# Patient Record
Sex: MALE | Race: WHITE | Employment: FULL TIME | URBAN - METROPOLITAN AREA
[De-identification: names, ages, dates, MRNs, and addresses within clinical notes are randomized per-mention and may not be internally consistent; named-entity substitution may affect disease eponyms.]

---

## 2020-08-07 ENCOUNTER — HOSPITAL ENCOUNTER (EMERGENCY)
Age: 60
Discharge: HOME | End: 2020-08-07
Payer: COMMERCIAL

## 2020-08-07 ENCOUNTER — APPOINTMENT (OUTPATIENT)
Dept: GENERAL RADIOLOGY | Age: 60
End: 2020-08-07
Payer: COMMERCIAL

## 2020-08-07 VITALS
WEIGHT: 222 LBS | HEIGHT: 71 IN | SYSTOLIC BLOOD PRESSURE: 171 MMHG | TEMPERATURE: 97.6 F | HEART RATE: 62 BPM | RESPIRATION RATE: 17 BRPM | DIASTOLIC BLOOD PRESSURE: 93 MMHG | BODY MASS INDEX: 31.08 KG/M2 | OXYGEN SATURATION: 98 %

## 2020-08-07 PROCEDURE — 90715 TDAP VACCINE 7 YRS/> IM: CPT | Performed by: PHYSICIAN ASSISTANT

## 2020-08-07 PROCEDURE — 73130 X-RAY EXAM OF HAND: CPT

## 2020-08-07 PROCEDURE — 6360000002 HC RX W HCPCS: Performed by: PHYSICIAN ASSISTANT

## 2020-08-07 PROCEDURE — 99282 EMERGENCY DEPT VISIT SF MDM: CPT

## 2020-08-07 PROCEDURE — 12002 RPR S/N/AX/GEN/TRNK2.6-7.5CM: CPT

## 2020-08-07 PROCEDURE — 90471 IMMUNIZATION ADMIN: CPT | Performed by: PHYSICIAN ASSISTANT

## 2020-08-07 RX ORDER — HYDROCODONE BITARTRATE AND ACETAMINOPHEN 5; 325 MG/1; MG/1
1 TABLET ORAL EVERY 8 HOURS PRN
Qty: 9 TABLET | Refills: 0 | Status: SHIPPED | OUTPATIENT
Start: 2020-08-07 | End: 2020-08-10

## 2020-08-07 RX ORDER — ASPIRIN 81 MG/1
81 TABLET, CHEWABLE ORAL DAILY
COMMUNITY

## 2020-08-07 RX ORDER — BACITRACIN, NEOMYCIN, POLYMYXIN B 400; 3.5; 5 [USP'U]/G; MG/G; [USP'U]/G
OINTMENT TOPICAL
Status: DISCONTINUED
Start: 2020-08-07 | End: 2020-08-07 | Stop reason: HOSPADM

## 2020-08-07 RX ADMIN — TETANUS TOXOID, REDUCED DIPHTHERIA TOXOID AND ACELLULAR PERTUSSIS VACCINE, ADSORBED 0.5 ML: 5; 2.5; 8; 8; 2.5 SUSPENSION INTRAMUSCULAR at 12:10

## 2020-08-07 SDOH — HEALTH STABILITY: MENTAL HEALTH: HOW OFTEN DO YOU HAVE A DRINK CONTAINING ALCOHOL?: NEVER

## 2020-08-07 ASSESSMENT — PAIN DESCRIPTION - PAIN TYPE: TYPE: ACUTE PAIN

## 2020-08-07 ASSESSMENT — ENCOUNTER SYMPTOMS
CONSTIPATION: 0
SHORTNESS OF BREATH: 0
ABDOMINAL PAIN: 0
VOMITING: 0
COLOR CHANGE: 0
NAUSEA: 0
DIARRHEA: 0

## 2020-08-07 ASSESSMENT — PAIN SCALES - GENERAL: PAINLEVEL_OUTOF10: 6

## 2020-08-07 NOTE — ED NOTES
Bed: 26  Expected date:   Expected time:   Means of arrival:   Comments:  malik Rajput, LUNA  08/07/20 6017

## 2020-08-07 NOTE — ED PROVIDER NOTES
**EVALUATED BY MARISAS**    [address] [facility] dEPART[facility] eNCOUnter    Pt Name: My Bullock  MRN: [id number]  [place] 1960  Date of evaluation: 8/7/2020  Provider: SHERI Nash    Chief Complaint:    Chief Complaint   Patient presents with    Laceration     Pt to ER with c/o laceration to back left hand after getting hit by fan in [facility], states covered whole back of hand. still actively bleeding at ER arrival. wrapped by Er staff. takes baby ASA daily. denies hand getting caught       Nursing Notes, Past Medical Hx, Past Surgical Hx, Social Hx, Allergies, and Family Hx were all reviewed and agreedwith or any disagreements were addressed in the HPI.    HPI:  (Location, Duration, Timing, Severity, Quality, Assoc Sx, Context, Modifying factors)  This is a  60 y.o. male who presents to the emergency department with complaints of sticking his hand in a running AC fan. Patient has a large laceration extending the entire dorsum of the hand. Unknown last tetanus shot. Right-hand-dominant. Denies any decreased range of motion of the left hand or wrist.  Denies numbness tingling or weakness. Patient does take a baby aspirin daily. Patient's laceration is not a straight laceration and there is some jagged edges. He rates his overall pain 6 out of 10 in severity described as throbbing. Patient does live a few hours away from here but does come home once a week to help his father out with some property management. All other systems were reviewed and are negative.      Past Medical/Surgical History:      Diagnosis Date    Hypertension          Procedure Laterality Date    PROSTATE SURGERY      jan 2020       Medications:  Previous Medications    ASPIRIN 81 MG CHEWABLE TABLET    Take 81 mg by mouth daily    MONTELUKAST SODIUM (SINGULAIR PO)    Take by mouth    NONFORMULARY    2 Bp meds, unsure of names    TAMSULOSIN HCL (FLOMAX PO)    Take by mouth         Review of Systems:  Review of Systems   Constitutional: Negative for chills, fatigue and fever. Respiratory: Negative for shortness of breath. Cardiovascular: Negative for chest pain. Gastrointestinal: Negative for abdominal pain, constipation, diarrhea, nausea and vomiting. Musculoskeletal: Positive for arthralgias (left hand). Negative for neck stiffness. Skin: Positive for wound. Negative for color change. Neurological: Negative for weakness and numbness. All other systems reviewed and are negative. Positives and Pertinent negatives as per HPI. Except as noted above in the ROS, all other systems were reviewed/completed and are negative. Physical Exam:  Physical Exam  Vitals signs and nursing note reviewed. Constitutional:       Appearance: Normal appearance. He is well-developed. He is not toxic-appearing or diaphoretic. HENT:      Head: Normocephalic. Right Ear: External ear normal.      Left Ear: External ear normal.      Nose: Nose normal.   Eyes:      General:         Right eye: No discharge. Left eye: No discharge. Conjunctiva/sclera: Conjunctivae normal.   Neck:      Musculoskeletal: Normal range of motion and neck supple. Cardiovascular:      Rate and Rhythm: Normal rate and regular rhythm. Heart sounds: Normal heart sounds. No murmur. No friction rub. No gallop. Comments: 2+ left radial pulse, brisk capillary refill of left upper extremity  Pulmonary:      Effort: Pulmonary effort is normal. No respiratory distress. Breath sounds: Normal breath sounds. No wheezing or rales. Musculoskeletal: Normal range of motion. Skin:     General: Skin is warm and dry. Coloration: Skin is not pale. Comments: Large approximate 4 inch laceration to the dorsum of the left hand vertically. Neurological:      Mental Status: He is alert and oriented to person, place, and time. Psychiatric:         Behavior: Behavior normal. Behavior is cooperative. vascular damage noted      Tendon damage location:  Upper extremity    Upper extremity tendon damage location:  Finger extensor    Tendon damage extent:  Partial transection    Tendon repair plan:  Refer for evaluation    Contaminated: no    Treatment:     Area cleansed with:  Betadine, saline and Hibiclens    Amount of cleaning:  Extensive    Irrigation solution:  Sterile saline    Irrigation method:  Syringe    Visualized foreign bodies/material removed: no    Skin repair:     Repair method:  Sutures    Suture size:  5-0    Suture material:  Nylon    Suture technique:  Running locked and simple interrupted    Number of sutures:  12  Approximation:     Approximation:  Close  Post-procedure details:     Dressing:  Antibiotic ointment, non-adherent dressing and bulky dressing    Patient tolerance of procedure: Tolerated well, no immediate complications  Comments:      8 throws in running retention suture, 4 single interrupted sutures. Patient was given:  Medications   neomycin-bacitracin-polymyxin (NEOSPORIN) 400-5-5000 ointment (has no administration in time range)   lidocaine-EPINEPHrine 1 percent-1:[id number] injection (has no administration in time range)   Tetanus-Diphth-Acell Pertussis (BOOSTRIX) injection 0.5 mL (0.5 mLs Intramuscular Given 8/7/20 1210)   This is a  60 y.o. male who presents to the emergency department with complaints of sticking his hand in a running AC fan. Patient has a large laceration extending the entire dorsum of the hand. Unknown last tetanus shot. Right-hand-dominant. Denies any decreased range of motion of the left hand or wrist.  Denies numbness tingling or weakness. Patient does take a baby aspirin daily. Patient's laceration is not a straight laceration and there is some jagged edges. He rates his overall pain 6 out of 10 in severity described as throbbing.   Patient does live a few hours away from here but does come home once a week to help his father out with some mis-transcribed.)    Electronically signed, SHERI Hurley,             SHERI Pfeiffer  08/07/20 1529

## 2020-08-10 ENCOUNTER — OFFICE VISIT (OUTPATIENT)
Dept: ORTHOPEDIC SURGERY | Age: 60
End: 2020-08-10
Payer: COMMERCIAL

## 2020-08-10 VITALS — BODY MASS INDEX: 31.08 KG/M2 | HEIGHT: 71 IN | TEMPERATURE: 97.6 F | WEIGHT: 222 LBS

## 2020-08-10 PROBLEM — S61.412A LACERATION OF LEFT HAND WITHOUT FOREIGN BODY: Status: ACTIVE | Noted: 2020-08-10

## 2020-08-10 PROCEDURE — G8419 CALC BMI OUT NRM PARAM NOF/U: HCPCS | Performed by: ORTHOPAEDIC SURGERY

## 2020-08-10 PROCEDURE — 99203 OFFICE O/P NEW LOW 30 MIN: CPT | Performed by: ORTHOPAEDIC SURGERY

## 2020-08-10 PROCEDURE — G8427 DOCREV CUR MEDS BY ELIG CLIN: HCPCS | Performed by: ORTHOPAEDIC SURGERY

## 2020-08-10 PROCEDURE — 1036F TOBACCO NON-USER: CPT | Performed by: ORTHOPAEDIC SURGERY

## 2020-08-10 PROCEDURE — 3017F COLORECTAL CA SCREEN DOC REV: CPT | Performed by: ORTHOPAEDIC SURGERY

## 2020-08-10 RX ORDER — METOPROLOL SUCCINATE 50 MG/1
TABLET, EXTENDED RELEASE ORAL
COMMUNITY
Start: 2020-07-09

## 2020-08-10 RX ORDER — LISINOPRIL 30 MG/1
TABLET ORAL
COMMUNITY
Start: 2020-07-09

## 2020-08-10 NOTE — PROGRESS NOTES
This 60 y.o. right hand dominant /  is seen in referral for the ED at [facility] with a chief complaint of an injury to the left hand, dorsal.  The injury occurred 3 days ago when the patient was struck by a fan blade. He noticed pain, bleeding. The patient was evaluated at the Hospital where the wound was debrieded and sutured. A partially lacerated tendon was suspected. A lacerated nerve was not suspected. A foreign body was not found. Antibiotics were not prescribed. Tetanus prophylaxis was updated. The patient was sent for hand surgery consultation. The pain assessment has been reviewed and is correct. The patient's social history, past medical history, family history, medications, allergies and review of systems, entered 8/10/20, have been reviewed, and dated and are recorded in the chart. On physical examination the patient is Height: 5' 11\" (180.3 cm) tall and weighs Weight: 222 lb (100.7 kg). Respirations are 18 per minute. The patient is well nourished, is oriented to time and place, demonstrates appropriate mood and affect as well as normal gait and station. There is a 10 cm linear laceration involving the dorsal aspect of the left hand. There is absent drainage. There is absent sign of infection. Range of motion of the hand is normal. Distal sensation  is normal.  Distal circulation is intact. There is no deformity. All joints are stable. Deep tendon reflexes are present bilaterally. There is no cellulitis or lymphangitis. There is no proximal adenopathy. There is no sign of additional significant injury to the opposite upper extremity. Review of outside Xrays of the left hand demonstrate a soft tissue wound only. Impression: Laceration left hand with partial extensor tendon laceration described by the ED. The nature of this medical problem is fully discussed with the patient, including all treatment options.  All questions are answered. He is instructed about activity precautions and wound care. The patient is given a return appointment for 10 days for wound check and possible suture removal and is instructed to call sooner if there are any questions or problems.

## 2020-08-20 ENCOUNTER — OFFICE VISIT (OUTPATIENT)
Dept: ORTHOPEDIC SURGERY | Age: 60
End: 2020-08-20
Payer: COMMERCIAL

## 2020-08-20 VITALS — WEIGHT: 222 LBS | HEIGHT: 71 IN | TEMPERATURE: 98.1 F | BODY MASS INDEX: 31.08 KG/M2

## 2020-08-20 PROCEDURE — 3017F COLORECTAL CA SCREEN DOC REV: CPT | Performed by: ORTHOPAEDIC SURGERY

## 2020-08-20 PROCEDURE — 99212 OFFICE O/P EST SF 10 MIN: CPT | Performed by: ORTHOPAEDIC SURGERY

## 2020-08-20 PROCEDURE — 1036F TOBACCO NON-USER: CPT | Performed by: ORTHOPAEDIC SURGERY

## 2020-08-20 PROCEDURE — G8417 CALC BMI ABV UP PARAM F/U: HCPCS | Performed by: ORTHOPAEDIC SURGERY

## 2020-08-20 PROCEDURE — G8427 DOCREV CUR MEDS BY ELIG CLIN: HCPCS | Performed by: ORTHOPAEDIC SURGERY

## 2020-08-20 NOTE — PROGRESS NOTES
Patient returns to the office for evaluation of   Chief Complaint   Patient presents with    Hand Injury     left hand laceration; wound check   The patient is 2 weeks post injury. He has had no significant problems and voices no complaints. The patient's social history, past medical history, family history, medications, allergies and review of systems have been reviewed, dated 8/10/20 and are recorded in the chart. On examination there is less swelling, no deformity, no drainage and no tenderness at the laceration site. Range of motion is improved and active extension of all digits is full. The laceration is healing cleanly, without sign of infection. The sutures are removed. Steri strips are applied. The patient is instructed about skin care, activities and exercises. The usual course of events in the resolution of the symptoms associated with this condition is fully discussed with the patient. As long as they progress as expected, they do not need to return for further follow up. They are, however, urged to call or return if they have questions or concerns or if full painless function of their hand has not returned by 2 weeks from today.